# Patient Record
Sex: MALE | Race: WHITE | NOT HISPANIC OR LATINO | URBAN - METROPOLITAN AREA
[De-identification: names, ages, dates, MRNs, and addresses within clinical notes are randomized per-mention and may not be internally consistent; named-entity substitution may affect disease eponyms.]

---

## 2019-08-05 ENCOUNTER — TELEPHONE (OUTPATIENT)
Dept: DERMATOLOGY | Facility: CLINIC | Age: 59
End: 2019-08-05

## 2019-08-05 NOTE — TELEPHONE ENCOUNTER
----- Message from Vida Lanza sent at 8/5/2019 10:43 AM CDT -----  Contact: [phone]  Needs Advice    Reason for call:  Dr. Dru strauss is referring to [facility].  Biopsy being faxed over today.        Communication Preference:call tomorrow to schedule.  Thanks    Additional Information:

## 2019-08-05 NOTE — TELEPHONE ENCOUNTER
Contacted  Concepcion he is a new pt who was referred by Dr. Davila. He is a new pt with Dr. Milner with bx proven MMIS. He will need to be seen in consult.

## 2019-08-08 ENCOUNTER — INITIAL CONSULT (OUTPATIENT)
Dept: DERMATOLOGY | Facility: CLINIC | Age: 59
End: 2019-08-08
Payer: COMMERCIAL

## 2019-08-08 VITALS
SYSTOLIC BLOOD PRESSURE: 125 MMHG | DIASTOLIC BLOOD PRESSURE: 76 MMHG | BODY MASS INDEX: 27.09 KG/M2 | HEIGHT: 72 IN | WEIGHT: 200 LBS | HEART RATE: 59 BPM

## 2019-08-08 DIAGNOSIS — D03.4 MELANOMA IN SITU OF SCALP: Primary | ICD-10-CM

## 2019-08-08 PROCEDURE — 99242 PR OFFICE CONSULTATION,LEVEL II: ICD-10-PCS | Mod: S$GLB,,, | Performed by: DERMATOLOGY

## 2019-08-08 PROCEDURE — 99999 PR PBB SHADOW E&M-EST. PATIENT-LVL III: ICD-10-PCS | Mod: PBBFAC,,, | Performed by: DERMATOLOGY

## 2019-08-08 PROCEDURE — 99999 PR PBB SHADOW E&M-EST. PATIENT-LVL III: CPT | Mod: PBBFAC,,, | Performed by: DERMATOLOGY

## 2019-08-08 PROCEDURE — 99242 OFF/OP CONSLTJ NEW/EST SF 20: CPT | Mod: S$GLB,,, | Performed by: DERMATOLOGY

## 2019-08-08 RX ORDER — MULTIVITAMIN
1 TABLET ORAL
COMMUNITY

## 2019-08-08 RX ORDER — FINASTERIDE 5 MG/1
5 TABLET, FILM COATED ORAL DAILY
Refills: 2 | COMMUNITY
Start: 2019-06-15

## 2019-08-08 RX ORDER — IBUPROFEN 100 MG/5ML
1000 SUSPENSION, ORAL (FINAL DOSE FORM) ORAL
COMMUNITY

## 2019-08-08 RX ORDER — MOMETASONE FUROATE 50 UG/1
SPRAY, METERED NASAL
COMMUNITY
Start: 2018-05-02

## 2019-08-08 RX ORDER — FOLIC ACID 1 MG/1
800 TABLET ORAL
COMMUNITY

## 2019-08-08 RX ORDER — NAPROXEN SODIUM 220 MG/1
TABLET, FILM COATED ORAL
Refills: 11 | COMMUNITY
Start: 2019-06-14

## 2019-08-08 RX ORDER — OMEGA-3 FATTY ACIDS 1000 MG
2 CAPSULE ORAL
COMMUNITY

## 2019-08-08 RX ORDER — BRIMONIDINE TARTRATE AND TIMOLOL MALEATE 2; 5 MG/ML; MG/ML
SOLUTION OPHTHALMIC
COMMUNITY
Start: 2018-04-13

## 2019-08-08 NOTE — PROGRESS NOTES
Please keep encounter open to track progress of PA on [place] so we can get patient scheduled and time set aside on nursing schedule to complete.    Mary Lopez, MSN, RN-BC  Care Coordinator  [facility]     REFERRING MD:  Dru Davila M.D.    CHIEF COMPLAINT:  New patient being consulted for Mohs' surgery evaluation.    HISTORY OF PRESENT ILLNESS:  58 y.o. male presents with an unknown duration of growth on the L vertex scalp. (+) family h/o melanoma- father, brother, and sister. Found on skin check.    Negative for scabbing.  Negative for crusting.  Negative for bleeding.  Negative for itching.    Biopsy consistent with lentigo maligna melanoma.     No prior treatment.    Pacemaker: No  Defibrillator: No  Artificial joints: No  Artificial heart valves: No    PAST MEDICAL HISTORY:  History reviewed. No pertinent past medical history.    PAST SURGICAL HISTORY:  History reviewed. No pertinent surgical history.     SOCIAL HISTORY:  Dependencies:  never smoked    PERTINENT MEDICATIONS:  See medications list.  aspirin and fish oil    ALLERGIES:  Patient has no known allergies.    ROS:  Skin: See HPI  Constitutional: No fatigue, fever, malaise, weight loss, or night sweats.  Cardiovascular: No chest pain, palpitations, or edema.  Respiratory: No coughing, wheezing, SOB, or sputum production.    Physical Exam   HENT:   Head:             General: Mood and affect normal. Alert and orient X3. Normal appearance.  Scalp: L vertex scalp with a 12 x 12 mm pink bx site located 12.5 cm superiorly from the left superior ear attachment and 7.5 cm posteriorly. No nodularity or satellite pigmenation.  Neck:  no suspicious lesions  Lymph nodes: Bilateral pre-auricular, post-auricular, anterior cervical, posterior cervical, sublingual, submental, and occipital are not enlarged    IMPRESSION:  Biopsy proven lentigo maligna melanoma, L vertex scalp, path# [id number].    PLAN:  The diagnosis and the pathology report were discussed in detail with the patient. Treatment options were reviewed, including Mohs Micrographic Surgery, radiation, topical therapy, and standard excision.  After careful review of patient's history and physical exam,  and after discussion of treatment options, the decision was made to perform 360 degree continuous en face margin controlled excision with 5 mm margins with rush permanent sections and subsequent delayed reconstruction pending negative margins.    Scheduled patient for slow Mohs. Risks, benefits, and alternatives of Mohs' surgery discussed with the patient. Discussed repair options including complex closure, skin flap, skin graft and second intention healing with the patient. Pre-operative instructions provided to the patient. Okay to stay on aspirin. Stop fish oil a week prior to procedure. Disc risk of scar, hair loss, possible need for plastics.  Disc q 3 month skin checks and annual eye exam.     Consulting report is sent to the consulting provider.

## 2019-08-26 ENCOUNTER — PROCEDURE VISIT (OUTPATIENT)
Dept: DERMATOLOGY | Facility: CLINIC | Age: 59
End: 2019-08-26
Payer: COMMERCIAL

## 2019-08-26 VITALS
SYSTOLIC BLOOD PRESSURE: 109 MMHG | BODY MASS INDEX: 27.09 KG/M2 | DIASTOLIC BLOOD PRESSURE: 73 MMHG | HEIGHT: 72 IN | WEIGHT: 200 LBS | HEART RATE: 59 BPM

## 2019-08-26 DIAGNOSIS — D03.4 MELANOMA IN SITU OF SCALP: Primary | ICD-10-CM

## 2019-08-26 DIAGNOSIS — D03.9 LENTIGO MALIGNA: ICD-10-CM

## 2019-08-26 PROCEDURE — 11623 PR EXC SKIN MALIG 2.1-3 CM REMAINDR BODY: ICD-10-PCS | Mod: S$GLB,,, | Performed by: DERMATOLOGY

## 2019-08-26 PROCEDURE — 88305 TISSUE EXAM BY PATHOLOGIST: CPT | Performed by: PATHOLOGY

## 2019-08-26 PROCEDURE — 11623 EXC S/N/H/F/G MAL+MRG 2.1-3: CPT | Mod: S$GLB,,, | Performed by: DERMATOLOGY

## 2019-08-26 PROCEDURE — 99499 UNLISTED E&M SERVICE: CPT | Mod: S$GLB,,, | Performed by: DERMATOLOGY

## 2019-08-26 PROCEDURE — 99499 NO LOS: ICD-10-PCS | Mod: S$GLB,,, | Performed by: DERMATOLOGY

## 2019-08-26 PROCEDURE — 88305 TISSUE SPECIMEN TO PATHOLOGY, DERMATOLOGY: ICD-10-PCS | Mod: 26,,, | Performed by: PATHOLOGY

## 2019-08-26 RX ORDER — CEPHALEXIN 500 MG/1
500 CAPSULE ORAL 3 TIMES DAILY
Qty: 30 CAPSULE | Refills: 0 | Status: SHIPPED | OUTPATIENT
Start: 2019-08-26 | End: 2019-09-05

## 2019-08-26 RX ORDER — HYDROCODONE BITARTRATE AND ACETAMINOPHEN 5; 325 MG/1; MG/1
1 TABLET ORAL EVERY 6 HOURS PRN
Qty: 10 TABLET | Refills: 0 | Status: SHIPPED | OUTPATIENT
Start: 2019-08-26

## 2019-08-26 NOTE — PROGRESS NOTES
PROCEDURE: 360-degree continuous en face margin-controlled excision with rush permanent sections and subsequent delayed repair    REFERRING MD: Dru Davila M.D.    SURGEON: Malik Milner MD    ASSISTANTS: Nahomi Ireland MA     ANESTHETIC: 10 cc 1% Lidocaine with Epinephrine 1:100,000    SURGICAL PREP: Hibiclens, prepped by Nahomi Ireland MA    PREOPERATIVE DIAGNOSIS: lentigo maligna, path # [id number]    POSTOPERATIVE DIAGNOSIS: lentigo maligna    PATHOLOGIC DIAGNOSIS: Pending    LOCATION: left scalp vertex. Patient verified location by looking at photo taken prior to procedure.     INITIAL LESION SIZE: 1.2 x 1.2 cm    EXCISED MARGINS: 0.5 cm    DEFECT SIZE:  2.2 x 2.2 cm    PREPARATION:  The diagnosis, procedure, alternatives, benefits and risks, including but not limited to: drug reactions, pain, scar or cosmetic defect, local sensation disturbances, and/or recurrence of present condition were explained to the patient. The patient elected to proceed.    PROCEDURE:  The area involved by the lentigo maligna was marked out with a surgical marking pen. The area of left scalp vertex was prepped, draped, and anesthetized in the usual sterile fashion. Lesional tissue was carefully marked with at least 5 mm margins of clinically normal skin in all directions. An excision was performed with a #15 blade carried down completely through the dermis to the deep subcutaneous tissue plane at galea. A short suture was placed posteriorly at the 12 o'clock position and a long suture was placed left laterally at the 3 o'clock position. The lesion was then removed in total and submitted for histological margin evaluation. Then, electrocoagulation was used to obtain hemostasis. Blood loss was minimal.    The patient tolerated the procedure well.    The area was cleaned and dressed appropriately and the patient was given wound care instructions, as well as an appointment for follow-up evaluation tomorrow for possible further  excision pending pathology report.    Vitals:    08/26/19 0801 08/26/19 0902   BP: 109/71 109/73   BP Location: Left arm    Patient Position: Sitting    BP Method: Medium (Automatic)    Pulse: (!) 59 (!) 59   Weight: 90.7 kg (200 lb)    Height: 6' (1.829 m)

## 2019-08-27 ENCOUNTER — PROCEDURE VISIT (OUTPATIENT)
Dept: DERMATOLOGY | Facility: CLINIC | Age: 59
End: 2019-08-27
Payer: COMMERCIAL

## 2019-08-27 VITALS
BODY MASS INDEX: 27.09 KG/M2 | SYSTOLIC BLOOD PRESSURE: 116 MMHG | DIASTOLIC BLOOD PRESSURE: 70 MMHG | HEIGHT: 72 IN | HEART RATE: 53 BPM | WEIGHT: 200 LBS

## 2019-08-27 DIAGNOSIS — D03.4 MELANOMA IN SITU OF SCALP: ICD-10-CM

## 2019-08-27 PROCEDURE — 99499 NO LOS: ICD-10-PCS | Mod: S$GLB,,, | Performed by: DERMATOLOGY

## 2019-08-27 PROCEDURE — 13121 CMPLX RPR S/A/L 2.6-7.5 CM: CPT | Mod: 79,S$GLB,, | Performed by: DERMATOLOGY

## 2019-08-27 PROCEDURE — 99499 UNLISTED E&M SERVICE: CPT | Mod: S$GLB,,, | Performed by: DERMATOLOGY

## 2019-08-27 PROCEDURE — 13121 PR RECMPL WND SCALP,EXTR 2.6-7.5 CM: ICD-10-PCS | Mod: 79,S$GLB,, | Performed by: DERMATOLOGY

## 2019-08-27 NOTE — PROGRESS NOTES
PROCEDURE: Complex Linear Repair    INDICATION: Status post 360 degree continuous en face excision with 5 mm margins for melanoma in situ performed yesterday. Verbal of clear margins per Dr. Moulton today.    REFERRING MD: Dru Davila M.D.    SURGEON: Malik Milner MD    ASSISTANTS: Sinai Rdz PA-C and Nahomi Ireland MA    ANESTHETIC: 12 cc 1% Lidocaine with Epinephrine 1:100,000    SURGICAL PREP: Hibiclens, prepped by Nahomi Ireland MA    LOCATION: left scalp vertex    DEFECT SIZE: 2.2 x 2.2 cm    WOUND REPAIR/DISPOSITION:  After the patient's melanoma in situ had been completely removed yesterday, a repair of the surgical defect was undertaken today. The patient was returned to the operating suite where the area of left scalp vertex was prepped, draped, and anesthetized in the usual sterile fashion. The wound was widely undermined in all directions. Then, electrocoagulation was used to obtain meticulous hemostasis. 3-0 Vicryl buried vertical mattress sutures were placed into the subcutaneous and dermal plane to close the wound and mendel the cutaneous wound edge. Bilateral dog ears were identified and were removed by a standard Burow's triangle technique. The cutaneous wound edges were closed using interrupted 3-0 Prolene suture.    The patient tolerated the procedure well.    The area was cleaned and dressed appropriately and the patient was given wound care instructions, as well as appointment for follow-up evaluation. Patient was placed on Norco 5-325 mg prn postop pain and Keflex 500 mg TID x 10 days.    LENGTH OF REPAIR: 5.5 cm    Vitals:    08/27/19 1107   BP: 116/70   BP Location: Left arm   Patient Position: Sitting   BP Method: Medium (Automatic)   Pulse: (!) 53   Weight: 90.7 kg (200 lb)   Height: 6' (1.829 m)

## 2019-09-10 ENCOUNTER — OFFICE VISIT (OUTPATIENT)
Dept: DERMATOLOGY | Facility: CLINIC | Age: 59
End: 2019-09-10
Payer: COMMERCIAL

## 2019-09-10 DIAGNOSIS — Z09 POSTOP CHECK: Primary | ICD-10-CM

## 2019-09-10 PROCEDURE — 99999 PR PBB SHADOW E&M-EST. PATIENT-LVL II: CPT | Mod: PBBFAC,,, | Performed by: DERMATOLOGY

## 2019-09-10 PROCEDURE — 99024 POSTOP FOLLOW-UP VISIT: CPT | Mod: S$GLB,,, | Performed by: DERMATOLOGY

## 2019-09-10 PROCEDURE — 99999 PR PBB SHADOW E&M-EST. PATIENT-LVL II: ICD-10-PCS | Mod: PBBFAC,,, | Performed by: DERMATOLOGY

## 2019-09-10 PROCEDURE — 99024 PR POST-OP FOLLOW-UP VISIT: ICD-10-PCS | Mod: S$GLB,,, | Performed by: DERMATOLOGY

## 2019-09-10 NOTE — PROGRESS NOTES
58 y.o. male patient is here for suture removal following 360 degree continuous en face excision  .    Patient reports no problems.    WOUND PE:  The L vertex scalp sutures intact. Wound healing well. Good skin edges. No signs or symptoms of infection.      IMPRESSION:  Healing operative site, MMIS L vertex scalp s/p 360 degree continuous en face excision  with CLC, postop day #14.    PLAN:  Sutures removed today.   Continue wound care.  Keep moist with Aquaphor.    RTC:  In 1 month.

## 2019-10-08 ENCOUNTER — OFFICE VISIT (OUTPATIENT)
Dept: DERMATOLOGY | Facility: CLINIC | Age: 59
End: 2019-10-08
Payer: COMMERCIAL

## 2019-10-08 DIAGNOSIS — D03.4 MELANOMA IN SITU OF SCALP: Primary | ICD-10-CM

## 2019-10-08 PROCEDURE — 99999 PR PBB SHADOW E&M-EST. PATIENT-LVL III: CPT | Mod: PBBFAC,,, | Performed by: DERMATOLOGY

## 2019-10-08 PROCEDURE — 99212 PR OFFICE/OUTPT VISIT, EST, LEVL II, 10-19 MIN: ICD-10-PCS | Mod: S$GLB,,, | Performed by: DERMATOLOGY

## 2019-10-08 PROCEDURE — 99999 PR PBB SHADOW E&M-EST. PATIENT-LVL III: ICD-10-PCS | Mod: PBBFAC,,, | Performed by: DERMATOLOGY

## 2019-10-08 PROCEDURE — 99212 OFFICE O/P EST SF 10 MIN: CPT | Mod: S$GLB,,, | Performed by: DERMATOLOGY

## 2019-10-08 NOTE — PROGRESS NOTES
59 y.o. male patient is here for wound check after surgery.    Patient reports no problems.    WOUND PE:  The L vertex scalp incision is well healed. Mild firmness and erythema of scar. No nodularity. No satellite pigmentation      IMPRESSION:  Healing operative site, Lentigo Maligna L vertex scalp s/p 360 degree continuous en face excision with CLC, postop week #6, well healed and no evidence of recurrence.    PLAN:  d/c wound care  Reassured patient that redness and firmness will fade with time.  Daily SPF.  Regular skin checks.    RTC:  In 3 months with Dru Davila M.D. for skin check or sooner if new concern arises.

## 2020-01-02 ENCOUNTER — HOSPITAL ENCOUNTER (OUTPATIENT)
Dept: RADIOLOGY | Facility: HOSPITAL | Age: 60
Discharge: HOME | End: 2020-01-02
Attending: ORTHOPAEDIC SURGERY
Payer: COMMERCIAL

## 2020-01-02 DIAGNOSIS — M25.562 LEFT KNEE PAIN: ICD-10-CM

## 2020-01-02 DIAGNOSIS — M25.562 LEFT KNEE PAIN: Primary | ICD-10-CM

## 2020-01-02 PROCEDURE — 73721 MRI JNT OF LWR EXTRE W/O DYE: CPT | Mod: TC,LT

## 2020-01-02 PROCEDURE — 73721 MRI JNT OF LWR EXTRE W/O DYE: CPT | Mod: 26,LT,, | Performed by: RADIOLOGY

## 2020-01-02 PROCEDURE — 73721 MRI KNEE WITHOUT CONTRAST LEFT: ICD-10-PCS | Mod: 26,LT,, | Performed by: RADIOLOGY

## 2022-09-02 ENCOUNTER — IMMUNIZATION (OUTPATIENT)
Dept: PHARMACY | Facility: CLINIC | Age: 62
End: 2022-09-02
Payer: COMMERCIAL

## 2022-09-30 ENCOUNTER — IMMUNIZATION (OUTPATIENT)
Dept: PHARMACY | Facility: CLINIC | Age: 62
End: 2022-09-30
Payer: COMMERCIAL

## 2022-09-30 DIAGNOSIS — Z23 NEED FOR VACCINATION: Primary | ICD-10-CM
